# Patient Record
Sex: FEMALE | Race: OTHER | NOT HISPANIC OR LATINO
[De-identification: names, ages, dates, MRNs, and addresses within clinical notes are randomized per-mention and may not be internally consistent; named-entity substitution may affect disease eponyms.]

---

## 2022-03-29 PROBLEM — Z00.00 ENCOUNTER FOR PREVENTIVE HEALTH EXAMINATION: Status: ACTIVE | Noted: 2022-03-29

## 2022-03-30 ENCOUNTER — APPOINTMENT (OUTPATIENT)
Dept: OTOLARYNGOLOGY | Facility: CLINIC | Age: 37
End: 2022-03-30
Payer: COMMERCIAL

## 2022-03-30 VITALS
HEIGHT: 61 IN | HEART RATE: 86 BPM | DIASTOLIC BLOOD PRESSURE: 78 MMHG | SYSTOLIC BLOOD PRESSURE: 116 MMHG | TEMPERATURE: 98.2 F | OXYGEN SATURATION: 99 %

## 2022-03-30 DIAGNOSIS — J35.1 HYPERTROPHY OF TONSILS: ICD-10-CM

## 2022-03-30 DIAGNOSIS — G47.9 SLEEP DISORDER, UNSPECIFIED: ICD-10-CM

## 2022-03-30 DIAGNOSIS — R06.83 SNORING: ICD-10-CM

## 2022-03-30 DIAGNOSIS — J03.90 ACUTE TONSILLITIS, UNSPECIFIED: ICD-10-CM

## 2022-03-30 PROCEDURE — 99203 OFFICE O/P NEW LOW 30 MIN: CPT | Mod: 25

## 2022-03-30 PROCEDURE — 31575 DIAGNOSTIC LARYNGOSCOPY: CPT

## 2022-03-30 NOTE — REVIEW OF SYSTEMS
[Patient Intake Form Reviewed] : Patient intake form was reviewed [Nasal Congestion] : nasal congestion [Hoarseness] : hoarseness [Throat Pain] : throat pain [Throat Dryness] : throat dryness [As Noted in HPI] : as noted in HPI [Swelling Neck] : swelling neck [Swelling Face] : face swelling [Negative] : Heme/Lymph

## 2022-04-08 NOTE — CONSULT LETTER
[Dear  ___] : Dear  [unfilled], [Consult Letter:] : I had the pleasure of evaluating your patient, [unfilled]. [Please see my note below.] : Please see my note below. [Consult Closing:] : Thank you very much for allowing me to participate in the care of this patient.  If you have any questions, please do not hesitate to contact me. [Sincerely,] : Sincerely, [DrMaria Isabel  ___] : Dr. YUNG [FreeTextEntry3] : Joby Brewster MD, FACS\par Professor of Otolaryngology, [facility] [facility] [employer] at [facility]\par Director, [facility], Department of Otolaryngology, [facility] [place] [facility]\par , Head [facility] Neck Service Line, [facility]\par

## 2022-04-08 NOTE — PROCEDURE
[Image(s) Captured] : image(s) captured and filed [Topical Lidocaine] : topical lidocaine [Oxymetazoline HCl] : oxymetazoline HCl [Flexible Endoscope] : examined with the flexible endoscope [Serial Number: ___] : Serial Number: [unfilled] [de-identified] :  Bilateral Tonsil Hypertrophy.  Deviated Nasal Septum.  Turbinate Hypertrophy. Otalgia.  Nasopharyngeal Cyst.  Post Nasal Discharge.

## 2022-04-08 NOTE — HISTORY OF PRESENT ILLNESS
[de-identified] : 36 years old female patient with history of Persistent enlarged tonsils and tonsillitis  for the past 4 weeks. Patient is present today in the office at the request of Dr. Mcallister for consultation and evaluation of.  Patient C/O \par Loud snoring. Observed episodes of stopped breathing during sleep. Abrupt awakenings accompanied by gasping or choking.  Awakening with a dry mouth or sore throat.  Bilateral Tonsil Hypertrophy.  Deviated Nasal Septum.  Turbinate Hypertrophy. Otalgia.  Nasopharyngeal Cyst.  Post Nasal Discharge.

## 2022-04-08 NOTE — REASON FOR VISIT
[Initial Consultation] : an initial consultation for [FreeTextEntry2] : Persistent enlarged tonsils and tonsillitis  for the past 4 weeks.  Patient states her level of severity is a level 10 out of 10 and it occurs constant.  Patient states nothing helps to improve or worsens her Persistent enlarged tonsils and tonsillitis  for the past 4 weeks.

## 2022-04-08 NOTE — PHYSICAL EXAM
[Normal] : tympanic membranes are normal in both ears [de-identified] :  Bilateral Tonsil Hypertrophy.  Deviated Nasal Septum.  Turbinate Hypertrophy. Otalgia.  Nasopharyngeal Cyst.  Post Nasal Discharge. [de-identified] :  Bilateral Tonsil Hypertrophy.  Deviated Nasal Septum.  Turbinate Hypertrophy. Otalgia.  Nasopharyngeal Cyst.  Post Nasal Discharge.

## 2022-04-12 DIAGNOSIS — Z01.818 ENCOUNTER FOR OTHER PREPROCEDURAL EXAMINATION: ICD-10-CM

## 2022-04-13 ENCOUNTER — OUTPATIENT (OUTPATIENT)
Dept: OUTPATIENT SERVICES | Facility: HOSPITAL | Age: 37
LOS: 1 days | End: 2022-04-13
Payer: COMMERCIAL

## 2022-04-13 ENCOUNTER — APPOINTMENT (OUTPATIENT)
Dept: SLEEP CENTER | Facility: HOME HEALTH | Age: 37
End: 2022-04-13
Payer: COMMERCIAL

## 2022-04-13 PROCEDURE — 95800 SLP STDY UNATTENDED: CPT

## 2022-04-13 PROCEDURE — 95800 SLP STDY UNATTENDED: CPT | Mod: 26

## 2022-04-15 DIAGNOSIS — G47.33 OBSTRUCTIVE SLEEP APNEA (ADULT) (PEDIATRIC): ICD-10-CM

## 2022-04-26 LAB — SARS-COV-2 N GENE NPH QL NAA+PROBE: NOT DETECTED

## 2022-04-27 ENCOUNTER — TRANSCRIPTION ENCOUNTER (OUTPATIENT)
Age: 37
End: 2022-04-27

## 2022-04-27 RX ORDER — AMOXICILLIN 500 MG/1
500 CAPSULE ORAL
Qty: 14 | Refills: 0 | Status: ACTIVE | COMMUNITY
Start: 2022-04-27 | End: 1900-01-01

## 2022-04-27 RX ORDER — ACETAMINOPHEN AND CODEINE 300; 30 MG/1; MG/1
300-30 TABLET ORAL
Qty: 30 | Refills: 0 | Status: ACTIVE | COMMUNITY
Start: 2022-04-27 | End: 1900-01-01

## 2022-04-27 RX ORDER — LIDOCAINE HYDROCHLORIDE 20 MG/ML
2 SOLUTION ORAL; TOPICAL
Qty: 1 | Refills: 4 | Status: ACTIVE | COMMUNITY
Start: 2022-04-27 | End: 1900-01-01

## 2022-04-27 RX ORDER — DOCUSATE SODIUM 100 MG/1
100 CAPSULE ORAL TWICE DAILY
Qty: 60 | Refills: 0 | Status: ACTIVE | COMMUNITY
Start: 2022-04-27 | End: 1900-01-01

## 2022-04-27 RX ORDER — METHYLPREDNISOLONE 4 MG/1
4 TABLET ORAL
Qty: 1 | Refills: 0 | Status: ACTIVE | COMMUNITY
Start: 2022-04-27 | End: 1900-01-01

## 2022-04-27 NOTE — ASU PATIENT PROFILE, ADULT - FALL HARM RISK - UNIVERSAL INTERVENTIONS
Bed in lowest position, wheels locked, appropriate side rails in place/Call bell, personal items and telephone in reach/Instruct patient to call for assistance before getting out of bed or chair/Non-slip footwear when patient is out of bed/[place] to call system/Physically safe environment - no spills, clutter or unnecessary equipment/Purposeful Proactive Rounding/Room/bathroom lighting operational, light cord in reach

## 2022-04-27 NOTE — BRIEF OPERATIVE NOTE - NSICDXBRIEFPREOP_GEN_ALL_CORE_FT
PRE-OP DIAGNOSIS:  Chronic tonsillitis 27-Apr-2022 13:28:43  Aruna Roach  Chronic streptococcal tonsillitis 27-Apr-2022 13:28:57  Aruna Roach A  Benign tumor of nasopharynx 27-Apr-2022 13:29:15  Aruna Roach

## 2022-04-27 NOTE — BRIEF OPERATIVE NOTE - NSICDXBRIEFPROCEDURE_GEN_ALL_CORE_FT
PROCEDURES:  Tonsillotomy in adult 27-Apr-2022 13:27:18  Aruna Roach  Lingual tonsillectomy using laser 27-Apr-2022 13:27:31  Aruna Roach  Excision of cyst of nasopharynx 27-Apr-2022 13:28:17  Aruna Roach

## 2022-04-27 NOTE — ASU DISCHARGE PLAN (ADULT/PEDIATRIC) - PROCEDURE
Laser Assisted Lingual Tonsil Ablation, Tonsillotomy, Excision of Nasopharyngeal Cyst Laser Assisted Lingual Tonsil Ablation, Tonsillotomy, Excision of Nasopharyngeal Cyst.  CO2 Laser and Diode Laser

## 2022-04-27 NOTE — BRIEF OPERATIVE NOTE - NSICDXBRIEFPOSTOP_GEN_ALL_CORE_FT
POST-OP DIAGNOSIS:  Chronic streptococcal tonsillitis 27-Apr-2022 13:29:45  Aruna Roach A  Hypertrophy of tonsils 27-Apr-2022 13:30:01  Aruna Roach A  Benign neoplasm of nasopharynx 27-Apr-2022 13:30:27  Aruna Roach

## 2022-04-27 NOTE — ASU DISCHARGE PLAN (ADULT/PEDIATRIC) - ACTIVITY LEVEL
No excercise/No heavy lifting/No sports/gym/No weight bearing Complex Repair And Dermal Autograft Text: The defect edges were debeveled with a #15 scalpel blade.  The primary defect was closed partially with a complex linear closure.  Given the location of the defect, shape of the defect and the proximity to free margins an dermal autograft was deemed most appropriate to repair the remaining defect.  The graft was trimmed to fit the size of the remaining defect.  The graft was then placed in the primary defect, oriented appropriately, and sutured into place.

## 2022-04-27 NOTE — ASU DISCHARGE PLAN (ADULT/PEDIATRIC) - CARE PROVIDER_API CALL
Joby Brewster)  Otolaryngology  [address], [address]  [place], [place] [address]  Phone: [phone]  Fax: [phone]  Established Patient  Follow Up Time: 1 week

## 2022-04-28 ENCOUNTER — OUTPATIENT (OUTPATIENT)
Dept: OUTPATIENT SERVICES | Facility: HOSPITAL | Age: 37
LOS: 1 days | Discharge: HOME | End: 2022-04-28
Payer: COMMERCIAL

## 2022-04-28 ENCOUNTER — RESULT REVIEW (OUTPATIENT)
Age: 37
End: 2022-04-28

## 2022-04-28 ENCOUNTER — APPOINTMENT (OUTPATIENT)
Dept: OTOLARYNGOLOGY | Facility: AMBULATORY SURGERY CENTER | Age: 37
End: 2022-04-28

## 2022-04-28 VITALS
OXYGEN SATURATION: 97 % | RESPIRATION RATE: 14 BRPM | WEIGHT: 179.68 LBS | TEMPERATURE: 98 F | HEART RATE: 84 BPM | SYSTOLIC BLOOD PRESSURE: 97 MMHG | DIASTOLIC BLOOD PRESSURE: 57 MMHG | HEIGHT: 61 IN

## 2022-04-28 VITALS
RESPIRATION RATE: 18 BRPM | SYSTOLIC BLOOD PRESSURE: 105 MMHG | TEMPERATURE: 97 F | OXYGEN SATURATION: 99 % | DIASTOLIC BLOOD PRESSURE: 60 MMHG | HEART RATE: 92 BPM

## 2022-04-28 DIAGNOSIS — Z90.49 ACQUIRED ABSENCE OF OTHER SPECIFIED PARTS OF DIGESTIVE TRACT: Chronic | ICD-10-CM

## 2022-04-28 PROCEDURE — 88304 TISSUE EXAM BY PATHOLOGIST: CPT | Mod: 26

## 2022-04-28 PROCEDURE — 30802 ABLATE INF TURBINATE SUBMUC: CPT | Mod: AS

## 2022-04-28 PROCEDURE — 42826 REMOVAL OF TONSILS: CPT | Mod: AS

## 2022-04-28 PROCEDURE — 42808 EXCISE PHARYNX LESION: CPT | Mod: AS

## 2022-04-28 PROCEDURE — 42826 REMOVAL OF TONSILS: CPT | Mod: 52

## 2022-04-28 PROCEDURE — 30802 ABLATE INF TURBINATE SUBMUC: CPT

## 2022-04-28 PROCEDURE — 42808 EXCISE PHARYNX LESION: CPT

## 2022-04-28 PROCEDURE — 42870 EXCISION OF LINGUAL TONSIL: CPT | Mod: AS

## 2022-04-28 RX ORDER — DOCUSATE SODIUM 100 MG
1 CAPSULE ORAL
Qty: 0 | Refills: 0 | DISCHARGE

## 2022-04-28 RX ORDER — BENZHYDROCODONE AND ACETAMINOPHEN 8.16; 325 MG/1; MG/1
1 TABLET ORAL
Qty: 0 | Refills: 0 | DISCHARGE

## 2022-04-28 RX ORDER — AMOXICILLIN 250 MG/5ML
1 SUSPENSION, RECONSTITUTED, ORAL (ML) ORAL
Qty: 0 | Refills: 0 | DISCHARGE

## 2022-04-28 RX ORDER — CETIRIZINE HYDROCHLORIDE 10 MG/1
1 TABLET ORAL
Qty: 0 | Refills: 0 | DISCHARGE

## 2022-04-28 RX ORDER — OXYMETAZOLINE HYDROCHLORIDE 0.5 MG/ML
0 SPRAY NASAL
Qty: 0 | Refills: 0 | DISCHARGE

## 2022-04-28 RX ORDER — LEVOTHYROXINE SODIUM 125 MCG
1 TABLET ORAL
Qty: 0 | Refills: 0 | DISCHARGE

## 2022-04-28 RX ORDER — SODIUM CHLORIDE 9 MG/ML
1000 INJECTION, SOLUTION INTRAVENOUS
Refills: 0 | Status: DISCONTINUED | OUTPATIENT
Start: 2022-04-28 | End: 2022-04-28

## 2022-04-28 RX ORDER — LIDOCAINE 4 G/100G
5 CREAM TOPICAL
Qty: 0 | Refills: 0 | DISCHARGE

## 2022-04-28 RX ORDER — FENTANYL CITRATE 50 UG/ML
25 INJECTION INTRAVENOUS
Refills: 0 | Status: DISCONTINUED | OUTPATIENT
Start: 2022-04-28 | End: 2022-04-28

## 2022-04-28 RX ORDER — ONDANSETRON 8 MG/1
4 TABLET, FILM COATED ORAL ONCE
Refills: 0 | Status: DISCONTINUED | OUTPATIENT
Start: 2022-04-28 | End: 2022-04-28

## 2022-04-28 RX ADMIN — FENTANYL CITRATE 25 MICROGRAM(S): 50 INJECTION INTRAVENOUS at 11:15

## 2022-04-28 RX ADMIN — SODIUM CHLORIDE 100 MILLILITER(S): 9 INJECTION, SOLUTION INTRAVENOUS at 10:17

## 2022-04-28 RX ADMIN — FENTANYL CITRATE 25 MICROGRAM(S): 50 INJECTION INTRAVENOUS at 10:35

## 2022-04-28 RX ADMIN — FENTANYL CITRATE 25 MICROGRAM(S): 50 INJECTION INTRAVENOUS at 11:30

## 2022-04-28 RX ADMIN — FENTANYL CITRATE 25 MICROGRAM(S): 50 INJECTION INTRAVENOUS at 10:20

## 2022-05-02 PROBLEM — E03.9 HYPOTHYROIDISM, UNSPECIFIED: Chronic | Status: ACTIVE | Noted: 2022-04-27

## 2022-05-02 PROBLEM — G43.909 MIGRAINE, UNSPECIFIED, NOT INTRACTABLE, WITHOUT STATUS MIGRAINOSUS: Chronic | Status: ACTIVE | Noted: 2022-04-27

## 2022-05-03 RX ORDER — IBUPROFEN 800 MG/1
800 TABLET, FILM COATED ORAL 3 TIMES DAILY
Qty: 1 | Refills: 0 | Status: ACTIVE | COMMUNITY
Start: 2022-05-03 | End: 1900-01-01

## 2022-05-04 ENCOUNTER — APPOINTMENT (OUTPATIENT)
Dept: OTOLARYNGOLOGY | Facility: CLINIC | Age: 37
End: 2022-05-04
Payer: COMMERCIAL

## 2022-05-04 VITALS
HEIGHT: 61 IN | TEMPERATURE: 98.2 F | WEIGHT: 170 LBS | SYSTOLIC BLOOD PRESSURE: 112 MMHG | HEART RATE: 84 BPM | BODY MASS INDEX: 32.1 KG/M2 | OXYGEN SATURATION: 97 % | DIASTOLIC BLOOD PRESSURE: 77 MMHG

## 2022-05-04 DIAGNOSIS — J34.3 HYPERTROPHY OF NASAL TURBINATES: ICD-10-CM

## 2022-05-04 DIAGNOSIS — J35.1 HYPERTROPHY OF TONSILS: ICD-10-CM

## 2022-05-04 DIAGNOSIS — J03.00 CHRONIC TONSILLITIS: ICD-10-CM

## 2022-05-04 DIAGNOSIS — J00 ACUTE NASOPHARYNGITIS [COMMON COLD]: ICD-10-CM

## 2022-05-04 DIAGNOSIS — J35.01 CHRONIC TONSILLITIS: ICD-10-CM

## 2022-05-04 DIAGNOSIS — J34.2 DEVIATED NASAL SEPTUM: ICD-10-CM

## 2022-05-04 PROCEDURE — 99024 POSTOP FOLLOW-UP VISIT: CPT

## 2022-05-04 PROCEDURE — 31237 NSL/SINS NDSC SURG BX POLYPC: CPT | Mod: 50,58

## 2022-05-04 NOTE — PROCEDURE
[Debridement] : debridement  [Congested] : congested [Bilateral] : bilateral debridement of the nasal cavity [Severe] : severe [Sadiq] : on both sides [Removed] : which was removed

## 2022-05-04 NOTE — REASON FOR VISIT
[Post-Operative Visit] : a post-operative visit [Family Member] : family member [FreeTextEntry2] : Status post Lingual Tonsil Ablation, Tonsillotomy, Excision of Nasopharyngeal Cyst Septoplasty.  Turbinate Reduction

## 2022-05-04 NOTE — REVIEW OF SYSTEMS
[Patient Intake Form Reviewed] : Patient intake form was reviewed [Nasal Congestion] : nasal congestion [As Noted in HPI] : as noted in HPI [Hoarseness] : hoarseness [Throat Pain] : throat pain [Throat Dryness] : throat dryness [Negative] : Heme/Lymph

## 2022-05-04 NOTE — PHYSICAL EXAM
[Normal] : tympanic membranes are normal in both ears [de-identified] :  Bilateral Tonsil Hypertrophy.  Deviated Nasal Septum.  Turbinate Hypertrophy. Otalgia.  Nasopharyngeal Cyst.  Post Nasal Discharge. [de-identified] :  Bilateral Tonsil Hypertrophy.  Deviated Nasal Septum.  Turbinate Hypertrophy. Otalgia.  Nasopharyngeal Cyst.  Post Nasal Discharge.

## 2022-05-04 NOTE — HISTORY OF PRESENT ILLNESS
[de-identified] : 36 years old female patient with history of Status post Lingual Tonsil Ablation, Tonsillotomy, Excision of Nasopharyngeal Cyst Septoplasty.  Turbinate Reduction.  Patient is present today in the office for nasal debridement and oral care.  Patient C/O left Otalgia after Saline nasal irrigations.  No evidence of infection is noted.  Patient is healing according to plan.

## 2022-05-12 LAB — SURGICAL PATHOLOGY STUDY: SIGNIFICANT CHANGE UP

## 2022-05-31 ENCOUNTER — APPOINTMENT (OUTPATIENT)
Dept: OTOLARYNGOLOGY | Facility: CLINIC | Age: 37
End: 2022-05-31

## 2025-01-09 NOTE — ASU PREOP CHECKLIST - SIDE RAILS UP
[Clear Rhinorrhea] : clear rhinorrhea [Erythematous Oropharynx] : erythematous oropharynx [NL] : warm, clear n/a

## (undated) DEVICE — COTTONBALL LG

## (undated) DEVICE — PETRI DISH MED 3.5"

## (undated) DEVICE — GLV 6.5 PROTEXIS W HYDROGEL

## (undated) DEVICE — Device

## (undated) DEVICE — APPLICATOR FOR FLOSEAL

## (undated) DEVICE — DRAPE TOWEL BLUE 17" X 24"

## (undated) DEVICE — GLV 7.5 PROTEXIS (WHITE)

## (undated) DEVICE — SLV COMPRESSION KNEE MED

## (undated) DEVICE — SYR LUER LOK 5CC

## (undated) DEVICE — WARMING BLANKET LOWER ADULT

## (undated) DEVICE — SOL ANTI FOG

## (undated) DEVICE — GOWN ROYAL SILK XL

## (undated) DEVICE — PACK TONSIL ADENOID

## (undated) DEVICE — SUT CHROMIC 2-0 27" CT-2

## (undated) DEVICE — PACK RHINOPLASTY

## (undated) DEVICE — ELCTR BOVIE SUCTION 8FR 6"

## (undated) DEVICE — TUBING RAPIDVAC SMOKE EVACUATOR .25" X 10FT